# Patient Record
Sex: FEMALE | Race: AMERICAN INDIAN OR ALASKA NATIVE
[De-identification: names, ages, dates, MRNs, and addresses within clinical notes are randomized per-mention and may not be internally consistent; named-entity substitution may affect disease eponyms.]

---

## 2019-09-08 ENCOUNTER — HOSPITAL ENCOUNTER (EMERGENCY)
Dept: HOSPITAL 5 - ED | Age: 49
LOS: 1 days | Discharge: HOME | End: 2019-09-09
Payer: MEDICAID

## 2019-09-08 DIAGNOSIS — Z79.899: ICD-10-CM

## 2019-09-08 DIAGNOSIS — Z88.2: ICD-10-CM

## 2019-09-08 DIAGNOSIS — M79.604: ICD-10-CM

## 2019-09-08 DIAGNOSIS — Z90.710: ICD-10-CM

## 2019-09-08 DIAGNOSIS — M79.605: ICD-10-CM

## 2019-09-08 DIAGNOSIS — E11.9: ICD-10-CM

## 2019-09-08 DIAGNOSIS — I10: ICD-10-CM

## 2019-09-08 DIAGNOSIS — Z98.890: ICD-10-CM

## 2019-09-08 DIAGNOSIS — R60.1: Primary | ICD-10-CM

## 2019-09-08 DIAGNOSIS — J45.909: ICD-10-CM

## 2019-09-08 LAB
APTT BLD: 29.8 SEC. (ref 24.2–36.6)
BASOPHILS # (AUTO): 0 K/MM3 (ref 0–0.1)
BASOPHILS NFR BLD AUTO: 0.6 % (ref 0–1.8)
EOSINOPHIL # BLD AUTO: 0.1 K/MM3 (ref 0–0.4)
EOSINOPHIL NFR BLD AUTO: 1.8 % (ref 0–4.3)
HCT VFR BLD CALC: 35.3 % (ref 30.3–42.9)
HGB BLD-MCNC: 11.4 GM/DL (ref 10.1–14.3)
INR PPP: 0.96 (ref 0.87–1.13)
LYMPHOCYTES # BLD AUTO: 2 K/MM3 (ref 1.2–5.4)
LYMPHOCYTES NFR BLD AUTO: 43.3 % (ref 13.4–35)
MCHC RBC AUTO-ENTMCNC: 32 % (ref 30–34)
MCV RBC AUTO: 80 FL (ref 79–97)
MONOCYTES # (AUTO): 0.3 K/MM3 (ref 0–0.8)
MONOCYTES % (AUTO): 7.2 % (ref 0–7.3)
PLATELET # BLD: 305 K/MM3 (ref 140–440)
RBC # BLD AUTO: 4.43 M/MM3 (ref 3.65–5.03)

## 2019-09-08 PROCEDURE — 82553 CREATINE MB FRACTION: CPT

## 2019-09-08 PROCEDURE — 99284 EMERGENCY DEPT VISIT MOD MDM: CPT

## 2019-09-08 PROCEDURE — 36415 COLL VENOUS BLD VENIPUNCTURE: CPT

## 2019-09-08 PROCEDURE — 85610 PROTHROMBIN TIME: CPT

## 2019-09-08 PROCEDURE — 85670 THROMBIN TIME PLASMA: CPT

## 2019-09-08 PROCEDURE — 85025 COMPLETE CBC W/AUTO DIFF WBC: CPT

## 2019-09-08 PROCEDURE — 80053 COMPREHEN METABOLIC PANEL: CPT

## 2019-09-08 PROCEDURE — 83880 ASSAY OF NATRIURETIC PEPTIDE: CPT

## 2019-09-08 PROCEDURE — 96372 THER/PROPH/DIAG INJ SC/IM: CPT

## 2019-09-08 PROCEDURE — 96374 THER/PROPH/DIAG INJ IV PUSH: CPT

## 2019-09-08 PROCEDURE — 84484 ASSAY OF TROPONIN QUANT: CPT

## 2019-09-08 PROCEDURE — 82550 ASSAY OF CK (CPK): CPT

## 2019-09-08 PROCEDURE — 85730 THROMBOPLASTIN TIME PARTIAL: CPT

## 2019-09-08 PROCEDURE — 85379 FIBRIN DEGRADATION QUANT: CPT

## 2019-09-08 PROCEDURE — 96375 TX/PRO/DX INJ NEW DRUG ADDON: CPT
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
## 2019-09-08 NOTE — EVENT NOTE
ED Screening Note


Date of service: 09/08/19


Time: 21:34


ED Screening Note: 


49 y o female presents with right sided pain


denies any injuries or trauma


PMH of HTN,DM


denies chest pain





This initial assessment/diagnostic orders/clinical plan/treatment(s) is/are 

subject to change based on patients health status, clinical progression and re-

assessment by fellow clinical providers in the ED. Further treatment and workup 

at subsequent clinical providers discretion. Patient/guardian urged not to elope

from the ED as their condition may be serious if not clinically assessed and 

managed. 





Initial orders include: 


labs.

## 2019-09-09 VITALS — SYSTOLIC BLOOD PRESSURE: 156 MMHG | DIASTOLIC BLOOD PRESSURE: 80 MMHG

## 2019-09-09 LAB
ALBUMIN SERPL-MCNC: 4 G/DL (ref 3.9–5)
ALT SERPL-CCNC: 36 UNITS/L (ref 7–56)
BUN SERPL-MCNC: 14 MG/DL (ref 7–17)
BUN/CREAT SERPL: 18 %
CALCIUM SERPL-MCNC: 9.3 MG/DL (ref 8.4–10.2)
HEMOLYSIS INDEX: 6